# Patient Record
(demographics unavailable — no encounter records)

---

## 2024-10-22 NOTE — ASSESSMENT
[FreeTextEntry1] : L shoulder RCTS with retraction, mild GH DJD. H/o R RCR.  Signs of cephalic migration, mild Gh DJD.   Discussed op versus non op tx, including the r/b/a/c of both. Discussed rehab and recovery after RSA. Activity modification. L SA injection 10/5/21, 7/12/22 with mild relief. R SA injection 9/17/24, minimal relief. She completed PT. Again Discussed R RSA. Recommend updated MRI to eval RCT.  RTO after MRI.
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
## 2024-10-22 NOTE — PHYSICAL EXAM
[Bilateral] : shoulder bilaterally [4 ___] : forward flexion 4[unfilled]/5 [4___] : external rotation 4[unfilled]/5 [] : no swelling [FreeTextEntry9] : FE: 110A, 120P  ER 10

## 2024-11-05 NOTE — DATA REVIEWED
[MRI] : MRI [Right] : of the right [Shoulder] : shoulder [I independently reviewed and interpreted images and report] : I independently reviewed and interpreted images and report [FreeTextEntry1] : Post op changes, recurrent supra and infra tears, labral fraying, biceps tendinopathy with medial sublux, GH arthrosis, capsular thickening.
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
## 2024-11-05 NOTE — ASSESSMENT
[FreeTextEntry1] : L shoulder RCTS with retraction, mild GH DJD. H/o R RCR.  Signs of cephalic migration, mild Gh DJD.   Previously discussed rehab and recovery after R RSA. L SA injection 10/5/21, 7/12/22 with mild relief. R SA injection 9/17/24, minimal relief. She completed PT. MRI R shoulder: Post op changes, recurrent supra and infra tears, labral fraying, biceps tendinopathy with medial sublux, GH arthrosis, capsular thickening. Request auth for R RSA.  Risks: The advantages, disadvantages, complications and alternatives of continued non-operative treatment versus operative treatment were discussed with the patient.   We specifically discussed the risks of bleeding, infection, damage to neurovascular structures, failure of wound healing, wound dehiscence, scaring, failure of arthroplasty, need for revision surgery, shoulder pain, shoulder stiffness, shoulder weakness, shoulder dislocation, stress fracture of the scapula and acromion and complications of surgery and anesthesia in general including deep venous thrombosis, pulmonary embolism, myocardial infarction, stroke, loss of limb and death.   We discussed that there may be limited range of motion, including forward elevation, abduction, internal and external rotation, and that full return of these motions is not predictable. The patient understood and agreed to proceed.

## 2024-12-19 NOTE — ASSESSMENT
[FreeTextEntry1] : L shoulder RCTS with retraction, mild GH DJD. H/o R RCR.  Signs of cephalic migration, mild Gh DJD.   Previously discussed rehab and recovery after R RSA. L SA injection 10/5/21, 7/12/22 with mild relief. R SA injection 9/17/24, minimal relief. She completed PT. MRI R shoulder: Post op changes, recurrent supra and infra tears, labral fraying, biceps tendinopathy with medial sublux, GH arthrosis, capsular thickening. She wants to proceed with R RSA, but she is figuring out the insurance coverage.
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
## 2025-01-21 NOTE — ASSESSMENT
[FreeTextEntry1] : L shoulder RCTS with retraction, mild GH DJD. H/o R RCR.  Signs of cephalic migration, mild Gh DJD.   Previously discussed rehab and recovery after R RSA. L SA injection 10/5/21, 7/12/22 with mild relief. R SA injection 9/17/24, minimal relief. She completed PT and has been doing an HEP.  She failed conservative treatment and has pain that affects her ADLs.  MRI R shoulder: Post op changes, recurrent supra and infra tears, labral fraying, biceps tendinopathy with medial sublux, GH arthrosis, capsular thickening. She wants to proceed with R RSA.  All her questions were answered.   Again recommend she see pain management as some of her symptoms are cervical in origin.   Risks: The advantages, disadvantages, complications and alternatives of continued non-operative treatment versus operative treatment were discussed with the patient.   We specifically discussed the risks of bleeding, infection, damage to neurovascular structures, failure of wound healing, wound dehiscence, scaring, failure of arthroplasty, need for revision surgery, shoulder pain, shoulder stiffness, shoulder weakness, shoulder dislocation, stress fracture of the scapula and acromion and complications of surgery and anesthesia in general including deep venous thrombosis, pulmonary embolism, myocardial infarction, stroke, loss of limb and death.   We discussed that there may be limited range of motion, including forward elevation, abduction, internal and external rotation, and that full return of these motions is not predictable. The patient understood and agreed to proceed.
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
## 2025-05-06 NOTE — ASSESSMENT
[FreeTextEntry1] : L shoulder RCTS with retraction, mild GH DJD. H/o R RCR.  Signs of cephalic migration, mild Gh DJD.   MRI R shoulder: Post op changes, recurrent supra and infra tears, labral fraying, biceps tendinopathy with medial sublux, GH arthrosis, capsular thickening. Now s/p RSA PT for PROM, AROM as tolerated. Overhead pulley. OK for IR. Ok for light biceps/triceps strengthening. RTO 6 weeks with xrays.

## 2025-05-06 NOTE — PHYSICAL EXAM
[Right] : right shoulder [] : limited range of motion compatible with recent surgery [FreeTextEntry9] : FE: 100 ER: 20

## 2025-07-29 NOTE — PHYSICAL EXAM
Occupational Therapy Evaluation    ASSESSMENT:   Patient seen on 3W nursing unit. Concepcion is an 89 year old female admitted from home where she resides with daughter. Patient reports that she is responsible for the home cares and daughter is limited in ability to assist due to RA. Concepcion is independent at baseline. She is admitted due to acute respiratory failure with hypoxia. Emphasis of session included assessment of baseline function, home set up, functional mobility and strength, and base components of safety/independence in self cares.     Patient completed sinkside grooming, oral cares, toileting and dressing tasks with supervision only. She completed bathing in sitting due to fatigue. Although patient was able to complete tasks without assist, fatigue evident requiring frequent breaks and use of supplemental O2. Patient did need assist to manage O2 line which would be a concern if she will have discharge with O2. Will continue to adjust goals as appropriate including line management if needed.    Further skilled occupational therapy is indicated to promote strength, endurance, and safety/independence in self cares.       OT Identified Barriers to Discharge: Weakness, quick to fatigue, requiring O2 at this time      PLAN AND RECOMMENDATIONS:   Recommendations for Discharge:  Recommendations for Discharge: OT WI: Home, Home therapy      Plan:   Continue skilled OT, including the following Treatment Interventions: ADL retraining;Functional transfer training;UE strengthening/ROM;Endurance training;Patient/Family training;Equipment eval/education;Continued evaluation (05/18/20 1001)    OT Frequency: Once a day;5 days/week;Other (comment)(1x Saturday) (05/18/20 1001)    Treatment Plan for Next Session: Shower and/or education in energy conservation                  EQUIPMENT:   PT/OT ADL Equipment for Discharge: Has walk in shower with raised toilet, anticipate no new needs (05/18/20 1001)        DIAGNOSIS:   1. Acute  hypoxemic respiratory failure (CMS/HCC)    2. Atypical pneumonia    3. Acute on chronic combined systolic and diastolic congestive heart failure (CMS/HCC)    4. Urinary tract infection without hematuria, site unspecified           PRECAUTIONS:   Precautions  Other Precautions: Droplet, fall risk       EDUCATION:   On this date, the patient was educated on diagnosis considerations pertaining to rehab.  The response to education was: Verbalizes understanding, Demonstrates understanding and Needs reinforcement.     SUBJECTIVE:   Subjective: Requesting to rest in bed after ADL activity  (05/18/20 1001)       OBJECTIVE:   Self Cares/ADLs:   Self Cares/ADL's  Grooming Assistance: Supervision;Standing at sink (05/18/20 1001)  Oral Hygiene Assistance: Supervision;Standing at sink (05/18/20 1001)  Bathing Assistance: Supervision;Standing at sink;Sitting at sink (05/18/20 1001)  Upper Body Dressing Assistance: (assist due to gown ) (05/18/20 1001)  Lower Body Clothing Assistance: Supervision (05/18/20 1001)  Lower Body Dressing Deficit: (Patient changed brief and demonstrated sock doff/don) (05/18/20 1001)  Toileting Assistance: Supervision (05/18/20 1001)  Self Cares/ADL's Comments #1: Patient required assist to manage O2 line (05/18/20 1001)      Household Mobility:    Household Mobility  Supine to Sit: Supervision (05/18/20 1001)  Sit to Supine: Supervision (05/18/20 1001)  Sit to Stand: Supervision (05/18/20 1001)  Stand to Sit: Supervision (05/18/20 1001)  Toilet Transfers: Supervision (05/18/20 1001)  Transfer Equipment: Gait belt and 4ww (05/18/20 1001)  Sitting - Static: Independent (05/18/20 1001)  Sitting - Dynamic: Independent (05/18/20 1001)  Standing - Static: Supervision (05/18/20 1001)  Standing - Dynamic: Supervision (05/18/20 1001)    Home Management:    not addressed during today's OT session     GOALS:   Short Term Goals to Be Reviewed On: 05/22/20 (05/18/20 1001)  Short Term Goals Are The Same as Discharge  Goals: Yes (05/18/20 1001)  Goal Agreement: Patient agrees with goals and treatment plan (05/18/20 1001)  Bathing Discharge Goal 1: To promote safety in baseline, patient to shower with set up(Please encourage patient to manage own O2 safely) (05/18/20 1001)  Toileting Discharge Goal 1: To promote safety in task, patient to complete toileting task on room air OR demonstrate management of O2 line for task (05/18/20 1001)  UE Function Discharge Goal 1: To promote activity tolerance, patient to demonstrate BUE HEP paired with therapeutic breathing (05/18/20 1001)  Other Discharge Goal 1: To promote safety for home discharge, patient to verbalize understanding of ADL and home care energy conservation techniques (05/18/20 1001)       EVALUATION CODE JUSTIFICATION:   Eval Code:  $ OT Eval:  Low Complexity: 1 Procedure  Problems/Co-morbidities:   Patient Active Problem List   Diagnosis   • COPD (chronic obstructive pulmonary disease) (CMS/[facility])   • CHF (congestive heart failure) (CMS/[facility])   • Essential hypertension   • Hyperlipidemia   • Coronary disease   • GERD (gastroesophageal reflux disease)   • Anemia   • Anxiety   • Depression   • Allergic rhinitis   • Osteoarthritis   • Irritable bowel   • Mitral regurgitation   • Interstitial lung disease (CMS/[facility])   • Generalized weakness   • Early dry stage nonexudative age-related macular degeneration of both eyes   • Dry eye syndrome   • Chalazion of right eye   • Hoarseness   • RA (rheumatoid arthritis) (CMS/[facility])   • CAP (community acquired pneumonia)   • COPD exacerbation (CMS/[facility])   • Hypoxia   • Hypokalemia   • Umbilical hernia without obstruction and without gangrene   • Ground glass opacity present on imaging of lung   • SOB (shortness of breath)   • Cough   • Atypical pneumonia   • Diarrhea   • Acute UTI   • Hyperglycemia   • Pseudophakia of both eyes   • Astigmatism of both eyes   • Myopia of right eye   • Age-related macular degeneration, dry, both eyes   • Blepharitis  of both eyes   • Major depressive disorder, single episode, mild (CMS/HCC)   • Stage 3 chronic kidney disease (CMS/HCC)   • CAP (community acquired pneumonia)   • Severe sepsis (CMS/HCC)   • Atherosclerosis of coronary artery   • Hypertension   • Hyperlipidemia   • Hypokalemia   • Hypomagnesemia   • Acute respiratory failure with hypoxia (CMS/HCC)     Personal Occupations Profile Affected: bathing/showering, toileting/toilet hygiene, lower body dressing, functional mobility/transfers, personal hygiene/grooming  Task Modification: Minimal to moderate task modification  Clinical decision making: Low - Patient has few limitations (1-3), comorbidities and/or complexities, as noted in problem focused assessment noted above, that impact their occupational profile.  Resulting in few treatment options and no task modification consistent with low clinical decision making complexity.      BILLING INFORMATION:   Total Treatment Time: OT Time Spent: 36 minutes   Timed Treatment Minutes:                [Right] : right shoulder [5 ___] : forward flexion 5[unfilled]/5 [] : strength is improving [FreeTextEntry9] : FE: 110A, 130P ER: 40P IR :L4
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
## 2025-07-29 NOTE — ASSESSMENT
[FreeTextEntry1] : L shoulder RCTS with retraction, mild GH DJD. H/o R RCR.  Signs of cephalic migration, mild Gh DJD.   MRI R shoulder: Post op changes, recurrent supra and infra tears, labral fraying, biceps tendinopathy with medial sublux, GH arthrosis, capsular thickening.  Now s/p RSA PT for PROM, AROM as tolerated. OK for strengthening.  WB limit 20 lbs. RTO 2 months to repeat xrays.